# Patient Record
Sex: MALE | Race: WHITE | NOT HISPANIC OR LATINO | Employment: UNEMPLOYED | URBAN - METROPOLITAN AREA
[De-identification: names, ages, dates, MRNs, and addresses within clinical notes are randomized per-mention and may not be internally consistent; named-entity substitution may affect disease eponyms.]

---

## 2018-08-01 ENCOUNTER — HOSPITAL ENCOUNTER (EMERGENCY)
Facility: OTHER | Age: 63
Discharge: HOME | End: 2018-08-01
Attending: EMERGENCY MEDICINE
Payer: MEDICAID

## 2018-08-01 VITALS
HEIGHT: 73 IN | BODY MASS INDEX: 28.49 KG/M2 | OXYGEN SATURATION: 95 % | WEIGHT: 215 LBS | RESPIRATION RATE: 18 BRPM | DIASTOLIC BLOOD PRESSURE: 79 MMHG | HEART RATE: 70 BPM | SYSTOLIC BLOOD PRESSURE: 115 MMHG | TEMPERATURE: 99 F

## 2018-08-01 DIAGNOSIS — R25.2 LEG CRAMPS: Primary | ICD-10-CM

## 2018-08-01 LAB
ANION GAP SERPL CALC-SCNC: 12 MMOL/L
BASOPHILS # BLD AUTO: 0.03 K/UL
BASOPHILS NFR BLD: 0.6 %
BUN SERPL-MCNC: 5 MG/DL
CALCIUM SERPL-MCNC: 9.3 MG/DL
CHLORIDE SERPL-SCNC: 102 MMOL/L
CK SERPL-CCNC: 221 U/L
CO2 SERPL-SCNC: 21 MMOL/L
CREAT SERPL-MCNC: 0.8 MG/DL
DIFFERENTIAL METHOD: ABNORMAL
EOSINOPHIL # BLD AUTO: 0.1 K/UL
EOSINOPHIL NFR BLD: 1.1 %
ERYTHROCYTE [DISTWIDTH] IN BLOOD BY AUTOMATED COUNT: 12.2 %
EST. GFR  (AFRICAN AMERICAN): >60 ML/MIN/1.73 M^2
EST. GFR  (NON AFRICAN AMERICAN): >60 ML/MIN/1.73 M^2
GLUCOSE SERPL-MCNC: 85 MG/DL
HCT VFR BLD AUTO: 42.2 %
HGB BLD-MCNC: 14.8 G/DL
LYMPHOCYTES # BLD AUTO: 1.2 K/UL
LYMPHOCYTES NFR BLD: 22.8 %
MAGNESIUM SERPL-MCNC: 2.5 MG/DL
MCH RBC QN AUTO: 35.1 PG
MCHC RBC AUTO-ENTMCNC: 35.1 G/DL
MCV RBC AUTO: 100 FL
MONOCYTES # BLD AUTO: 0.6 K/UL
MONOCYTES NFR BLD: 10.6 %
NEUTROPHILS # BLD AUTO: 3.5 K/UL
NEUTROPHILS NFR BLD: 64.3 %
PLATELET # BLD AUTO: 241 K/UL
PMV BLD AUTO: 9.5 FL
POTASSIUM SERPL-SCNC: 3.8 MMOL/L
RBC # BLD AUTO: 4.22 M/UL
SODIUM SERPL-SCNC: 135 MMOL/L
WBC # BLD AUTO: 5.39 K/UL

## 2018-08-01 PROCEDURE — 83735 ASSAY OF MAGNESIUM: CPT

## 2018-08-01 PROCEDURE — 80048 BASIC METABOLIC PNL TOTAL CA: CPT

## 2018-08-01 PROCEDURE — 82550 ASSAY OF CK (CPK): CPT

## 2018-08-01 PROCEDURE — 25000003 PHARM REV CODE 250: Performed by: EMERGENCY MEDICINE

## 2018-08-01 PROCEDURE — 99284 EMERGENCY DEPT VISIT MOD MDM: CPT | Mod: 25

## 2018-08-01 PROCEDURE — 85025 COMPLETE CBC W/AUTO DIFF WBC: CPT

## 2018-08-01 PROCEDURE — 96360 HYDRATION IV INFUSION INIT: CPT

## 2018-08-01 RX ORDER — FLUOXETINE HYDROCHLORIDE 20 MG/1
20 CAPSULE ORAL DAILY
COMMUNITY

## 2018-08-01 RX ORDER — SODIUM CHLORIDE 9 MG/ML
1000 INJECTION, SOLUTION INTRAVENOUS
Status: COMPLETED | OUTPATIENT
Start: 2018-08-01 | End: 2018-08-01

## 2018-08-01 RX ORDER — IBUPROFEN 200 MG
1 TABLET ORAL
COMMUNITY

## 2018-08-01 RX ADMIN — SODIUM CHLORIDE 1000 ML: 0.9 INJECTION, SOLUTION INTRAVENOUS at 05:08

## 2018-08-01 NOTE — ED PROVIDER NOTES
Encounter Date: 8/1/2018    SCRIBE #1 NOTE: I, Altaf Hughes, am scribing for, and in the presence of, Dr. Murdock .       History     Chief Complaint   Patient presents with    leg cramps     bilateral, picked up by MARY on side of road     Time seen by provider: 5:06 PM    This is a 63 y.o. male who presents via EMS with complaint of sudden, constant, severe, left calf cramping that began today. He notes the cramping intermittently radiates to his left knee and left ankle. He states the cramping has affected his ability to stand or walk. Patient notes history of DVT twenty years ago. He has not been experiencing fevers, chills, headaches, dizziness, congestion, rhinorrhea, sore throat, cough, SOB, chest pain, abdominal pain, N/V/D, other cramping, or urinary symptoms. Patient reports that he is homeless, works at [employer], and rides a bicycle daily. He complies with Prozac daily. He admits daily use of alcohol.         The history is provided by the patient.     Review of patient's allergies indicates:  No Known Allergies  Past Medical History:   Diagnosis Date    Chronic cough      History reviewed. No pertinent surgical history.  Family History   Problem Relation Age of Onset    Cancer Mother     Heart disease Father     Hyperlipidemia Father     Hypertension Father     Diabetes Father      Social History   Substance Use Topics    Smoking status: Current Every Day Smoker     Packs/day: 0.50     Years: 5.00    Smokeless tobacco: Former User    Alcohol use Yes      Comment: 3-4 beers every weekend     Review of Systems   Constitutional: Negative for chills and fever.   HENT: Negative for congestion, rhinorrhea and sore throat.    Respiratory: Negative for cough and shortness of breath.    Cardiovascular: Negative for chest pain.   Gastrointestinal: Negative for abdominal pain, diarrhea, nausea and vomiting.   Genitourinary: Negative for decreased urine volume, difficulty urinating, dysuria, frequency and  urgency.   Musculoskeletal: Negative for back pain.        Positive for left calf cramping.    Skin: Negative for rash.   Neurological: Negative for dizziness, weakness and headaches.   Hematological: Does not bruise/bleed easily.   Psychiatric/Behavioral: Negative for confusion.       Physical Exam     Initial Vitals   BP Pulse Resp Temp SpO2   08/01/18 1706 08/01/18 1708 08/01/18 1708 08/01/18 1708 08/01/18 1708   (!) 141/89 72 18 98.5 °F (36.9 °C) 96 %      MAP       --                Physical Exam    Nursing note and vitals reviewed.  Constitutional: He appears well-developed and well-nourished. No distress.   HENT:   Head: Normocephalic and atraumatic.   Eyes: Conjunctivae and EOM are normal. Pupils are equal, round, and reactive to light.   Neck: Normal range of motion. Neck supple.   Cardiovascular: Normal rate, regular rhythm and normal heart sounds.   Pulmonary/Chest: Breath sounds normal. No respiratory distress.   Abdominal: Soft. He exhibits no distension. There is no tenderness.   Musculoskeletal:   No lower extremity edema. No palpable masses of calves.    Neurological: He is alert and oriented to person, place, and time. He has normal strength.   Skin: Skin is warm and dry.   Psychiatric: He has a normal mood and affect. His behavior is normal. Judgment and thought content normal.         ED Course   Procedures  Labs Reviewed   CK - Abnormal; Notable for the following:        Result Value     (*)     All other components within normal limits   CBC W/ AUTO DIFFERENTIAL - Abnormal; Notable for the following:     RBC 4.22 (*)      (*)     MCH 35.1 (*)     All other components within normal limits   BASIC METABOLIC PANEL - Abnormal; Notable for the following:     Sodium 135 (*)     CO2 21 (*)     BUN, Bld 5 (*)     All other components within normal limits   MAGNESIUM          Imaging Results    None          Medical Decision Making:   Clinical Tests:   Lab Tests: Ordered and  Reviewed    Additional MDM:   Comments: 63-year-old male presents complaining of intermittent left calf cramping sensation.  He denies any other associated symptoms. Patient was concerned for a possible DVT a given history of a previous DVT over 20 years ago.  His exam and presentation are not consistent with this.  Patient was reassured.  Labs were obtained including CBC, BMP, magnesium the case.  CPK only minimally elevated.  He received a total of 2 L of IV fluids (1 L from EMS and 1 L in the emergency department).  Patient was counseled on the importance of staying well hydrated as he does spend a lot of time outside when he is not working since he is currently homeless.  Patient will be discharged at this time in stable condition..          Scribe Attestation:   Scribe #1: I performed the above scribed service and the documentation accurately describes the services I performed. I attest to the accuracy of the note.    Attending Attestation:           Physician Attestation for Scribe:  Physician Attestation Statement for Scribe #1: I, Dr. Murdock, reviewed documentation, as scribed by Altaf Hughes  in my presence, and it is both accurate and complete.                    Clinical Impression:     1. Leg cramps                                   Tessie Murdock MD  08/01/18 6281

## 2018-08-01 NOTE — ED NOTES
Bed: [facility]  Expected date:   Expected time:   Means of arrival:   Comments:  Homeless man coming in w/ leg cramps

## 2018-08-01 NOTE — ED TRIAGE NOTES
"Pt Presents to ED via ems  Who rep[orts leg cramping and pt was homeless, 800 cc bolus given. Pt came in with C/O cramping in left calf x 3 weeks. Pt states" my cramping used to go away but the last 2 nights I couldn't sleep because of the cramping".  Pt denies chest pain, SOB, N/V, fever, diarrhea. rn notes  Dry cough. Pt AAO x 4 , Pt appears calm and cooperative. No family at bedside. RN noted no redness or swelling to left calf. dorsalis pedus pulse + 2.   "

## 2018-12-17 ENCOUNTER — HOSPITAL ENCOUNTER (EMERGENCY)
Facility: OTHER | Age: 63
Discharge: HOME | End: 2018-12-17
Attending: EMERGENCY MEDICINE
Payer: MEDICAID

## 2018-12-17 VITALS
HEIGHT: 68 IN | HEART RATE: 70 BPM | WEIGHT: 190 LBS | BODY MASS INDEX: 28.79 KG/M2 | OXYGEN SATURATION: 94 % | RESPIRATION RATE: 16 BRPM | TEMPERATURE: 98 F | SYSTOLIC BLOOD PRESSURE: 133 MMHG | DIASTOLIC BLOOD PRESSURE: 74 MMHG

## 2018-12-17 DIAGNOSIS — F10.921 ALCOHOL INTOXICATION WITH DELIRIUM: Primary | ICD-10-CM

## 2018-12-17 PROCEDURE — 99282 EMERGENCY DEPT VISIT SF MDM: CPT

## 2018-12-18 NOTE — ED NOTES
Pt friend arrived to ED to  patient and bring him home. MD at bedside with patient discussing D/C.
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
## 2018-12-18 NOTE — ED NOTES
"Pt insisting on walking out of hospital. Pt continues to yell "let me out", "fuck yall" "leave me alone". Security remains at pts side   "

## 2018-12-18 NOTE — ED NOTES
"Security at EMS stretcher in [facility] directly observing pt. Pt yelling he was "misdiagnosed", "my brother was killed here", "fuck yall I want to go", this is a worthless hospital", "i dont like where I am", "i dont give a fuck"  "

## 2018-12-18 NOTE — ED NOTES
Pt wants staff to call his girlfriend willie @ [phone] for a sober ride. Willie contacted and agreed to pick pt up.

## 2018-12-18 NOTE — ED NOTES
"Pt continues to yell. Pt yelling he has sandwiches in his pockets because he is homeless. "yall are just gona throw me out the door aditya I have medicaid. Pt eating sandwiches from pocket. Security remains by pt.   "

## 2019-01-07 DIAGNOSIS — K85.90 ACUTE PANCREATITIS: Primary | ICD-10-CM

## 2019-01-16 ENCOUNTER — HOSPITAL ENCOUNTER (OUTPATIENT)
Dept: RADIOLOGY | Facility: HOSPITAL | Age: 64
Discharge: HOME | End: 2019-01-16
Attending: FAMILY MEDICINE
Payer: MEDICAID

## 2019-01-16 DIAGNOSIS — K85.90 ACUTE PANCREATITIS: ICD-10-CM

## 2019-03-05 ENCOUNTER — HOSPITAL ENCOUNTER (EMERGENCY)
Facility: HOSPITAL | Age: 64
Discharge: HOME | End: 2019-03-06
Payer: MEDICAID

## 2019-03-05 DIAGNOSIS — F10.920 ALCOHOLIC INTOXICATION WITHOUT COMPLICATION: Primary | ICD-10-CM

## 2019-03-05 DIAGNOSIS — M25.569 KNEE PAIN: ICD-10-CM

## 2019-03-05 LAB
ALBUMIN SERPL BCP-MCNC: 4.3 G/DL
ALP SERPL-CCNC: 78 U/L
ALT SERPL W/O P-5'-P-CCNC: 17 U/L
AMPHET+METHAMPHET UR QL: NEGATIVE
ANION GAP SERPL CALC-SCNC: 8 MMOL/L
AST SERPL-CCNC: 31 U/L
BARBITURATES UR QL SCN>200 NG/ML: NEGATIVE
BASOPHILS # BLD AUTO: 0.03 K/UL
BASOPHILS NFR BLD: 0.5 %
BENZODIAZ UR QL SCN>200 NG/ML: NEGATIVE
BILIRUB SERPL-MCNC: 0.5 MG/DL
BUN SERPL-MCNC: 6 MG/DL
BZE UR QL SCN: NEGATIVE
CALCIUM SERPL-MCNC: 9.2 MG/DL
CANNABINOIDS UR QL SCN: NEGATIVE
CHLORIDE SERPL-SCNC: 100 MMOL/L
CO2 SERPL-SCNC: 27 MMOL/L
CREAT SERPL-MCNC: 0.73 MG/DL
CREAT UR-MCNC: 12.3 MG/DL
DIFFERENTIAL METHOD: ABNORMAL
EOSINOPHIL # BLD AUTO: 0.2 K/UL
EOSINOPHIL NFR BLD: 2.8 %
ERYTHROCYTE [DISTWIDTH] IN BLOOD BY AUTOMATED COUNT: 12.4 %
EST. GFR  (AFRICAN AMERICAN): >60 ML/MIN/1.73 M^2
EST. GFR  (NON AFRICAN AMERICAN): >60 ML/MIN/1.73 M^2
ETHANOL SERPL-MCNC: 253 MG/DL
GLUCOSE SERPL-MCNC: 91 MG/DL
HCT VFR BLD AUTO: 44.7 %
HGB BLD-MCNC: 15.1 G/DL
LYMPHOCYTES # BLD AUTO: 1.6 K/UL
LYMPHOCYTES NFR BLD: 27.3 %
MCH RBC QN AUTO: 32.4 PG
MCHC RBC AUTO-ENTMCNC: 33.8 G/DL
MCV RBC AUTO: 96 FL
METHADONE UR QL SCN>300 NG/ML: NEGATIVE
MONOCYTES # BLD AUTO: 0.4 K/UL
MONOCYTES NFR BLD: 6.9 %
NEUTROPHILS # BLD AUTO: 3.6 K/UL
NEUTROPHILS NFR BLD: 62.3 %
OPIATES UR QL SCN: NEGATIVE
PCP UR QL SCN>25 NG/ML: NEGATIVE
PLATELET # BLD AUTO: 231 K/UL
PMV BLD AUTO: 9.3 FL
POCT GLUCOSE: 87 MG/DL (ref 70–110)
POTASSIUM SERPL-SCNC: 3.9 MMOL/L
PROT SERPL-MCNC: 7.5 G/DL
RBC # BLD AUTO: 4.66 M/UL
SODIUM SERPL-SCNC: 135 MMOL/L
TOXICOLOGY INFORMATION: ABNORMAL
WBC # BLD AUTO: 5.78 K/UL

## 2019-03-05 PROCEDURE — 96360 HYDRATION IV INFUSION INIT: CPT | Mod: ER

## 2019-03-05 PROCEDURE — 85025 COMPLETE CBC W/AUTO DIFF WBC: CPT | Mod: ER

## 2019-03-05 PROCEDURE — 82962 GLUCOSE BLOOD TEST: CPT | Mod: ER

## 2019-03-05 PROCEDURE — 99284 EMERGENCY DEPT VISIT MOD MDM: CPT | Mod: 25,ER

## 2019-03-05 PROCEDURE — 80320 DRUG SCREEN QUANTALCOHOLS: CPT | Mod: ER

## 2019-03-05 PROCEDURE — 25000003 PHARM REV CODE 250: Mod: ER | Performed by: PHYSICIAN ASSISTANT

## 2019-03-05 PROCEDURE — 80307 DRUG TEST PRSMV CHEM ANLYZR: CPT | Mod: ER

## 2019-03-05 PROCEDURE — 94760 N-INVAS EAR/PLS OXIMETRY 1: CPT | Mod: ER

## 2019-03-05 PROCEDURE — 80053 COMPREHEN METABOLIC PANEL: CPT | Mod: ER

## 2019-03-05 RX ORDER — NAPROXEN 250 MG/1
500 TABLET ORAL
Status: COMPLETED | OUTPATIENT
Start: 2019-03-05 | End: 2019-03-05

## 2019-03-05 RX ADMIN — SODIUM CHLORIDE 1000 ML: 0.9 INJECTION, SOLUTION INTRAVENOUS at 06:03

## 2019-03-05 RX ADMIN — NAPROXEN 500 MG: 250 TABLET ORAL at 06:03

## 2019-03-06 VITALS
OXYGEN SATURATION: 97 % | WEIGHT: 205 LBS | TEMPERATURE: 98 F | SYSTOLIC BLOOD PRESSURE: 120 MMHG | DIASTOLIC BLOOD PRESSURE: 79 MMHG | HEART RATE: 79 BPM | HEIGHT: 73 IN | BODY MASS INDEX: 27.17 KG/M2 | RESPIRATION RATE: 18 BRPM

## 2019-03-06 LAB — ETHANOL SERPL-MCNC: 92 MG/DL

## 2019-03-06 PROCEDURE — 80320 DRUG SCREEN QUANTALCOHOLS: CPT | Mod: ER

## 2019-03-06 NOTE — ED PROVIDER NOTES
Encounter Date: 3/5/2019       History     Chief Complaint   Patient presents with    Knee Injury     complains of fall and injury to left knee. intoxicated. arrived via ems from the [facility] where his girlfriend left him     Patient is a 63-year-old male brought in by EMS.  Patient states that he was at a convenience store and went to step off the curb, it was wet and his leg slipped out.  He reports falling down onto his left knee.  Denies hitting his head or having loss of consciousness.  Denies headache, nausea, vomiting.          Review of patient's allergies indicates:   Allergen Reactions    Asa [aspirin] Hives     Past Medical History:   Diagnosis Date    Chronic cough      No past surgical history on file.  Family History   Problem Relation Age of Onset    Cancer Mother     Heart disease Father     Hyperlipidemia Father     Hypertension Father     Diabetes Father      Social History     Tobacco Use    Smoking status: Current Every Day Smoker     Packs/day: 0.50     Years: 5.00     Pack years: 2.50    Smokeless tobacco: Former User   Substance Use Topics    Alcohol use: Yes     Comment: 3-4 beers every weekend    Drug use: No     Review of Systems   Constitutional: Negative for diaphoresis, fatigue and fever.        Fourteen point review of systems completed and negative with the exception HPI and below.   HENT: Negative for congestion, postnasal drip, sore throat and trouble swallowing.    Eyes: Negative for pain, discharge and itching.   Respiratory: Negative for cough, chest tightness, shortness of breath and wheezing.    Cardiovascular: Negative for chest pain, palpitations and leg swelling.   Gastrointestinal: Negative for abdominal distention, diarrhea, nausea and vomiting.   Endocrine: Negative for cold intolerance and heat intolerance.   Genitourinary: Negative for dysuria, enuresis and flank pain.   Musculoskeletal: Positive for joint swelling. Negative for back pain and neck pain.         Left knee pain   Skin: Negative for color change, rash and wound.   Neurological: Negative for dizziness, facial asymmetry, weakness, light-headedness and headaches.   Hematological: Does not bruise/bleed easily.   Psychiatric/Behavioral: Negative for agitation, behavioral problems and confusion.   All other systems reviewed and are negative.      Physical Exam     Initial Vitals [03/05/19 1753]   BP Pulse Resp Temp SpO2   110/71 (!) 59 -- 98.2 °F (36.8 °C) 99 %      MAP       --         Physical Exam    Constitutional: He appears well-developed and well-nourished. He is not diaphoretic.   HENT:   Head: Normocephalic and atraumatic.   Nose: Nose normal.   Mouth/Throat: No oropharyngeal exudate.   Eyes: Conjunctivae and EOM are normal. Pupils are equal, round, and reactive to light. Right eye exhibits no discharge. Left eye exhibits no discharge.   Neck: Normal range of motion. Neck supple. No thyromegaly present. No tracheal deviation present. No JVD present.   Cardiovascular: Normal rate, regular rhythm, normal heart sounds and intact distal pulses.   No murmur heard.  Pulmonary/Chest: Breath sounds normal. No stridor. No respiratory distress. He has no wheezes. He has no rales.   Abdominal: Soft. Bowel sounds are normal. He exhibits no distension. There is no tenderness. There is no rebound.   Musculoskeletal: Normal range of motion. He exhibits edema and tenderness.    left knee pain. On exam the left knee is tender to the patella, medially and laterally with pain on flexion and extension.  There is 1+ edema but no laxity or crepitus.   Neurological: He is alert and oriented to person, place, and time. He has normal strength. He displays normal reflexes. No cranial nerve deficit.   Skin: Skin is warm and dry. Capillary refill takes less than 2 seconds.   Psychiatric: He has a normal mood and affect. His behavior is normal. Thought content normal.         ED Course   Procedures  Labs Reviewed   CBC W/ AUTO  DIFFERENTIAL   COMPREHENSIVE METABOLIC PANEL   ALCOHOL,MEDICAL (ETHANOL)   DRUG SCREEN PANEL, URINE EMERGENCY   POCT GLUCOSE MONITORING CONTINUOUS          Imaging Results    None          Medical Decision Making:   Initial Assessment:   Patient is a 63-year-old male brought in by EMS.  Patient states that he was at a convenience store and went to step off the curb, it was wet and his leg slipped out.  He reports falling down onto his left knee.  Denies hitting his head or having loss of consciousness.  Denies headache, nausea, vomiting.  Patient admits to drinking two 24 oz beers prior to arrival.    On exam, the patient is intoxicated however he is alert and oriented x4.  He is cooperative and complaining of the left knee pain. On exam the left knee is tender to the patella medially and laterally with pain on flexion and extension.  There is 1+ edema but no laxity or crepitus.  Differential Diagnosis:   Alcohol intoxication, left knee sprain, fracture.  ED Management:  CBC, CMP, alcohol level all drawn.  Patient started with IV fluids for hydration.  Naproxen given for knee pain. Patient reports an allergy to aspirin but he takes Aleve and ibuprofen without difficulty.  X-ray of left knee showed no acute findings.  1842- Report given to oncoming clinician                      Clinical Impression:       ICD-10-CM ICD-9-CM   1. Alcoholic intoxication without complication F10.920 305.00   2. Knee pain M25.569 719.46                                Dawood Cintron PA-C  03/05/19 1842

## 2019-03-06 NOTE — ED NOTES
Called sister Janell, stated she would not be here to pick patient up until sometime tomorrow, asked patient if has anyone else to call patient denies